# Patient Record
Sex: MALE | Race: WHITE
[De-identification: names, ages, dates, MRNs, and addresses within clinical notes are randomized per-mention and may not be internally consistent; named-entity substitution may affect disease eponyms.]

---

## 2020-01-10 ENCOUNTER — HOSPITAL ENCOUNTER (EMERGENCY)
Dept: HOSPITAL 41 - JD.ED | Age: 40
LOS: 1 days | Discharge: HOME | End: 2020-01-11
Payer: COMMERCIAL

## 2020-01-10 VITALS — HEART RATE: 64 BPM | DIASTOLIC BLOOD PRESSURE: 89 MMHG | SYSTOLIC BLOOD PRESSURE: 141 MMHG

## 2020-01-10 DIAGNOSIS — S20.219A: Primary | ICD-10-CM

## 2020-01-10 DIAGNOSIS — Z88.8: ICD-10-CM
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
## 2020-01-10 PROCEDURE — 99285 EMERGENCY DEPT VISIT HI MDM: CPT

## 2020-01-10 PROCEDURE — 71260 CT THORAX DX C+: CPT

## 2020-01-11 NOTE — EDM.PDOC
ED HPI GENERAL MEDICAL PROBLEM





- General


Chief Complaint: Chest Pain


Stated Complaint: chest injury


Time Seen by Provider: 01/10/20 23:51


Source of Information: Reports: Patient


History Limitations: Reports: No Limitations





- History of Present Illness


INITIAL COMMENTS - FREE TEXT/NARRATIVE: 





This is a 39-year-old male.  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 

gallon drum that was filled with 45 gallons of waste material he was up on a 

forklift type palate and as he grabbed a barrel to tip it over the [place] 

slipped out from underneath it and the barrel came down and struck the edge of 

it struck him in his upper sternum.  This barrel had been up maybe about 5 feet 

or so when he tried to tip it over and it struck him.  He was knocked back and 

down but he did not hit his head and he denies any back injury or extremity 

injuries.  He does have central anterior chest pain with deep breathing and 

with movement of his muscles.  He is in no distress at this time and he is 

breathing normally.  He has not had any nausea or vomiting.


  ** Chest


Pain Score (Numeric/FACES): 8





- Related Data


 Allergies











Allergy/AdvReac Type Severity Reaction Status Date / Time


 


ANITIOTIC Allergy  Nausea Uncoded 01/14/15 13:07











Home Meds: 


 Home Meds





. [No Known Home Meds]  01/14/15 [History]











Past Medical History





- Past Health History


Medical/Surgical History: Denies Medical/Surgical History





ED ROS GENERAL





- Review of Systems


Review Of Systems: See Below


Constitutional: Denies: Fever, Chills


HEENT: Reports: No Symptoms


Respiratory: Reports: Other (As per HPI)


Cardiovascular: Reports: No Symptoms


Endocrine: Reports: No Symptoms


GI/Abdominal: Denies: Abdominal Pain


: Reports: No Symptoms


Musculoskeletal: Reports: Other (Anterior chest pain)


Skin: Reports: No Symptoms


Neurological: Reports: No Symptoms


Psychiatric: Reports: No Symptoms


Hematologic/Lymphatic: Reports: No Symptoms





ED EXAM, GENERAL





- Physical Exam


Exam: See Below


Exam Limited By: No Limitations


General Appearance: Alert, WD/WN, Mild Distress


Eye Exam: Bilateral Eye: Normal Inspection


Ears: Normal External Exam


Nose: Normal Inspection


Throat/Mouth: Normal Inspection, Normal Lips, Normal Voice, No Airway Compromise


Head: Atraumatic, Normocephalic


Neck: Supple, Non-Tender


Respiratory/Chest: No Respiratory Distress, Lungs Clear, Other (Good bilateral 

breath sounds, but deep breathing causes sternal pain and across the anterior 

chest pain but he is in no distress with breathing, movement of his arms or 

twisting causes increased pain in the anterior chest as well)


Cardiovascular: Regular Rate, Rhythm, No Murmur


GI/Abdominal: Soft


Back Exam: Full Range of Motion


Extremities: Normal Inspection, Normal Range of Motion


Neurological: Alert, Oriented


Psychiatric: Normal Affect, Normal Mood


Skin Exam: Warm, Dry





Course





- Vital Signs


Last Recorded V/S: 


 Last Vital Signs











Temp  97.6 F   01/10/20 23:53


 


Pulse  64   01/10/20 23:53


 


Resp  18   01/10/20 23:53


 


BP  141/89 H  01/10/20 23:53


 


Pulse Ox  100   01/10/20 23:53














- Orders/Labs/Meds


Orders: 


 Active Orders 24 hr











 Category Date Time Status


 


 Chest w Cont [CT] Stat Exams  01/10/20 23:57 Taken


 


 Sodium Chloride 0.9% [Saline Flush] Med  01/11/20 00:02 Active





 10 ml FLUSH ONETIME PRN   








 Medication Orders





Sodium Chloride (Saline Flush)  10 ml FLUSH ONETIME PRN


   PRN Reason: KEEP VEIN OPEN


   Last Admin: 01/11/20 00:37  Dose: 10 ml








Meds: 


Medications











Generic Name Dose Route Start Last Admin





  Trade Name Freq  PRN Reason Stop Dose Admin


 


Sodium Chloride  10 ml  01/11/20 00:02  01/11/20 00:37





  Saline Flush  FLUSH   10 ml





  ONETIME PRN   Administration





  KEEP VEIN OPEN   





     





     





     














Discontinued Medications














Generic Name Dose Route Start Last Admin





  Trade Name Freq  PRN Reason Stop Dose Admin


 


Iopamidol  80 ml  01/11/20 00:02  01/11/20 00:37





  Isovue-300 (61%)  IVPUSH  01/11/20 00:03  80 ml





  ONETIME ONE   Administration





     





     





     





     














- Radiology Interpretation


Free Text/Narrative:: 





The scan of the chest did not show any acute abnormalities and no fractures it 

was noted to have some noncalcified pulmonary nodules but the patient appears 

to be at low risk no follow-up was indicated.





- Re-Assessments/Exams


Free Text/Narrative Re-Assessment/Exam: 





01/11/20 01:24


I spoke to the patient regarding the CT scan results.  I also spoke to him 

about the noncalcified pulmonary nodules but since he is at low risk with no 

history of smoking or other known pulmonary factors he does not need to have 

follow-up at this time.  We talked about using ice to the chest taking 

ibuprofen or Aleve and being very careful about using his arms since he has a 

contusion to his chest.





Departure





- Departure


Time of Disposition: 01:24


Disposition: Home, Self-Care 01


Condition: Fair


Clinical Impression: 


 Contusion of muscle





Contusion of chest wall


Qualifiers:


 Encounter type: initial encounter Laterality: unspecified laterality Qualified 

Code(s): S20.219A - Contusion of unspecified front wall of thorax, initial 

encounter





Contusion of sternum


Qualifiers:


 Encounter type: initial encounter Qualified Code(s): S20.219A - Contusion of 

unspecified front wall of thorax, initial encounter








- Discharge Information


*PRESCRIPTION DRUG MONITORING PROGRAM REVIEWED*: Not Applicable


*COPY OF PRESCRIPTION DRUG MONITORING REPORT IN PATIENT [place]: Not Applicable


Instructions:  Chest Contusion, Adult, Easy-to-Read


Referrals: 


PCP,Not In Area [Primary Care Provider] - 


Forms:  ED Department Discharge, ED Return to Work/School Form


Additional Instructions: 


Very careful activity over the next week to 7 days, try to avoid any sort of 

straining or lifting with your arms and cross body movements with your arms, 

realize the tomorrow you are going to be more sore than you are now, use ice to 

your chest on and off for the next 2 days and then you may start using some heat

, take some ibuprofen or Aleve as needed for the soreness, no strenuous lifting 

for the next 7 to 10 days, follow-up with your family doctor later this week 

for recheck or return to the ER if your symptoms worsen





Sepsis Event Note





- Evaluation


Sepsis Screening Result: No Definite Risk





- Focused Exam


Vital Signs: 


 Vital Signs











  Temp Pulse Resp BP Pulse Ox


 


 01/10/20 23:53  97.6 F  64  18  141/89 H  100











Date Exam was Performed: 01/11/20


Time Exam was Performed: 01:23





- My Orders


Last 24 Hours: 


My Active Orders





01/10/20 23:57


Chest w Cont [CT] Stat 





01/11/20 00:02


Sodium Chloride 0.9% [Saline Flush]   10 ml FLUSH ONETIME PRN 














- Assessment/Plan


Last 24 Hours: 


My Active Orders





01/10/20 23:57


Chest w Cont [CT] Stat 





01/11/20 00:02


Sodium Chloride 0.9% [Saline Flush]   10 ml FLUSH ONETIME PRN

## 2020-01-12 NOTE — CT
CT chest

 

Technique: Multiple axial sections were obtained from above the lung 

apices inferiorly through the lung bases.

 

Comparison: Previous chest x-ray of 01/14/15.  Previous chest CT study

 of 09/20/13 is also available.

 

Findings: Mediastinum and hilar regions show no abnormality.  Aorta 

shows no aneurysm.  No mediastinal hematoma is seen.  No axillary 

adenopathy is seen.  Visualized upper abdominal structures show no 

discrete abnormality.

 

Lung window settings were reviewed.  No acute parenchymal process is 

seen within either side of the chest.  Minimal atelectasis or scarring

 is noted within the left base.  Several very small nodules measuring 

less than 5 mm are noted.  No pleural effusions are seen.

 

Bone window settings were reviewed.  No discrete rib fracture is 

appreciated.  Mild compression deformity is seen within T1.  No 

definite acute fracture line is appreciated.  Other vertebral body 

heights are maintained within the thoracic spine.  Lateral 

reconstructed sagittal views of the sternum appear intact.

 

Impression:

1.  Mild compression deformity of T1.  No acute fracture lines are 

seen and this may be old.

2.  Several small nodules measuring less than 5 mm.  If patient is not

 a smoker, these can be ignored.  If patient is a smoker, recommend 

noncontrast chest CT in one year.

3.  Nothing acute is appreciated on CT study of the chest.

 

Diagnostic code #3

 

This report was dictated in [place] Standard Time

 

I agree with preliminary report from [facility], finalized on 01/11/20, 2:11

 AM Central Time

## 2021-10-02 NOTE — EDM.PDOC
ED HPI GENERAL MEDICAL PROBLEM





- General


Chief Complaint: Upper Extremity Injury/Pain


Stated Complaint: LT ARM INJURY


Time Seen by Provider: 10/02/21 22:48


Source of Information: Reports: Patient, RN Notes Reviewed


History Limitations: Reports: No Limitations





- History of Present Illness


INITIAL COMMENTS - FREE TEXT/NARRATIVE: 





Patient is a 40 year old male who presents to the ED for his left wrist injury. 

The patient was at a bar drinking, trying to help a friend, when he stumbled 

over his boots and fell backwards. He has had pain in his left wrist since then,

there is a mild deformity noted at triage. Patient is denying any 

numbness/tingling into the hand. He is predominantly right handed. He did not 

take anything for pain prior to coming to the ER. He is denying any sick 

symptoms like fever or chills, cough or SOB, nausea/vomiting or diarrhea.


  ** left arm


Pain Score (Numeric/FACES): 8





- Related Data


                                    Allergies











Allergy/AdvReac Type Severity Reaction Status Date / Time


 


ANITIOTIC Allergy Unknown Nausea Uncoded 10/02/21 21:26











Home Meds: 


                                    Home Meds





. [No Known Home Meds]  01/14/15 [History]











Past Medical History





- Past Health History


Medical/Surgical History: Denies Medical/Surgical History


HEENT History: Reports: None


Cardiovascular History: Reports: None


Respiratory History: Reports: Other (See Below)


Other Respiratory History: walking Pneumonia


Gastrointestinal History: Reports: None


Genitourinary History: Reports: None


Musculoskeletal History: Reports: Other (See Below)


Other Musculoskeletal History: Left shoulder injury, Dislocated rib, broke bilat

 feet, surgery on bilat feet,


Neurological History: Reports: None


Psychiatric History: Reports: None


Endocrine/Metabolic History: Reports: None


Hematologic History: Reports: None


Immunologic History: Reports: None


Oncologic (Cancer) History: Reports: None


Dermatologic History: Reports: None





Social & Family History





- Caffeine Use


Caffeine Use: Reports: None





Review of Systems





- Review of Systems


Review Of Systems: Comprehensive ROS is negative, except as noted in HPI.





ED EXAM, GENERAL





- Physical Exam


Exam: See Below


Exam Limited By: No Limitations


General Appearance: Alert, WD/WN, No Apparent Distress


Respiratory/Chest: No Respiratory Distress, Lungs Clear, Normal Breath Sounds, 

No Accessory Muscle Use, Chest Non-Tender


Cardiovascular: Normal Peripheral Pulses, Regular Rate, Rhythm, No Edema


Peripheral Pulses: 2+: Radial (L), Radial (R)


Extremities: Normal Capillary Refill, Joint Swelling (mild amount of swelling to

 left wrist), Limited Range of Motion (of left wrist d/t pain)


Neurological: Alert, Oriented, Normal Cognition, No Motor/Sensory Deficits


Psychiatric: Normal Affect, Normal Mood


Skin Exam: Warm, Dry, Intact, Normal Color, No Rash





ED TRAUMA EXTREMITY PROCEDURES





- Splinting


  ** Left Upper Extremity


Splint Site: left wrist


Pre-Procedure NV Status: Normal


Post-Procedure NV Status: Normal


Splint Material: Fiberglass


Splint Design: Gutter (ulnar gutter short arm)


Applied & Form Fitted By: Provider, Nurse


Provider Post-Splint Application NV Check: NV Status Normal, Good Position





Course





- Orders/Labs/Meds


Orders: 





                               Active Orders 24 hr











 Category Date Time Status


 


 Wrist Comp Min 3V Lt [CR] Stat Exams  10/02/21 21:24 Taken











Meds: 





Medications














Discontinued Medications














Generic Name Dose Route Start Last Admin





  Trade Name Freq  PRN Reason Stop Dose Admin


 


Hydromorphone HCl  1 mg  10/02/21 22:49 





  Hydromorphone 1 Mg/Ml Syringe  IM  10/02/21 22:50 





  ONETIME ONE  














- Re-Assessments/Exams


Free Text/Narrative Re-Assessment/Exam: 





10/02/21 23:16


Patient presents to the ED for his left wrist injury. He will be given 1 mg IM 

Dilaudid for pain management.





Departure





- Departure


Time of Disposition: 23:17


Disposition: Home, Self-Care 01


Condition: Good


Clinical Impression: 


Distal radius fracture, left


Qualifiers:


 Encounter type: initial encounter Fracture type: closed Fracture morphology: 

other fracture Qualified Code(s): S52.592A - Other fractures of lower end of 

left radius, initial encounter for closed fracture








- Discharge Information


*PRESCRIPTION DRUG MONITORING PROGRAM REVIEWED*: Yes


*COPY OF PRESCRIPTION DRUG MONITORING REPORT IN PATIENT [place]: No


Instructions:  Wrist Fracture Treated With Immobilization, Easy-to-Read


Referrals: 


PCP,Not In Area [Primary Care Provider] - 


Additional Instructions: 


You have been evaluated in the ED for your left wrist injury.





Your x-ray demonstrated a fracture of your left distal radius, this is a 

comminuted, and may need surgical management to repair.





Please use ice as tolerated to the affected area. You may elevate the affected 

area to provide further relief from swelling.





You may take Tylenol 500 mg or ibuprofen 600mg q6 hrs for pain relief. Please do

so until you have a tolerable level of pain with activity. Do not exceed 4000mg 

Tylenol, Do not exceed 3200mg ibuprofen in a 24 hour time period.





You were given a prescription for a strong pain medication, 

oxycodone/acetaminophen 5/325, please take 1 tab every 6 hours as needed for 

pain not relieved by Tylenol or ibuprofen alone.  Please note this does contain 

Tylenol in it, so do not take more than 4000 mg in a 24-hour time span.  These 

medications can be addictive, so please take as few as possible to achieve 

adequate pain control.  These meds can also be quite constipating, recommend 

that you increase your oral fluid intake and take a stool softener like MiraLAX 

while taking these medications.





Please call Ortho for follow-up and further evaluation Dr. Gamez is our 

orthopedic surgeon, his office number is [phone].  Please call and set up 

an appointment as soon as possible for further management.





Please return to ED if your symptoms should change or worsen.








- My Orders


Last 24 Hours: 





My Active Orders





10/02/21 21:24


Wrist Comp Min 3V Lt [CR] Stat 














- Assessment/Plan


Last 24 Hours: 





My Active Orders





10/02/21 21:24


Wrist Comp Min 3V Lt [CR] Stat

## 2021-10-03 NOTE — CR
Left wrist: 4 views of the left wrist were obtained.

 

Comparison: No prior studies available.

 

Comminuted distal radial fracture is noted which shows articular 

extension.  There is posterior impaction being seen with dorsal tilt 

of the distal radial articular margin.

 

Small fracture is noted within the ulnar styloid process.

 

Soft tissue swelling is noted.  No additional bony abnormality is 

seen.

 

Impression:

1.  Distal radial fracture with articular extension and mild posterior

 impaction.

2.  Ulnar styloid avulsion fracture.

3.  Soft tissue swelling.

 

Diagnostic code #3

## 2021-10-07 NOTE — [id number]
Post Anesthesia Note





- EVALUATION WITHIN 48HRS OF ANESTHETIC


Vital Signs in Normal Range: Yes


Patient Participated in Evaluation: Yes


Respiratory Function Stable: Yes


Airway Patent: Yes


Cardiovascular Function Stable: Yes


Hydration Status Stable: Yes


Pain Control Satisfactory: Yes (took pain pills)


Nausea and Vomiting Control Satisfactory: Yes


Mental Status Recovered: Yes


Vital Signs: 


                                Last Vital Signs











Temp  97.2 F   10/07/21 12:50


 


Pulse  89   10/07/21 12:08


 


Resp  18   10/07/21 12:50


 


BP  139/106 H  10/07/21 12:50


 


Pulse Ox  95   10/07/21 12:50

## 2021-10-07 NOTE — PCM.PREANE
Preanesthetic Assessment





- Procedure


Proposed Procedure: 





Closed reduction with casting vs open reduction with internal fixation of left 

distal radius fracture 





- Anesthesia/Transfusion/Family Hx


Anesthesia History: Prior Anesthesia Reaction


Type of Anesthesia Reaction: Anesthesia Awareness (sounds like in a foot surgery

which may have been a MAC anesthetic )


Family History of Anesthesia Reaction: No


Transfusion History: No Prior Transfusion(s)


Intubation History: Unknown





- Review of Systems


General: No Symptoms


Pulmonary: No Symptoms


Cardiovascular: No Symptoms


Gastrointestinal: No Symptoms


Neurological: No Symptoms


Other: Reports: None





- Physical Assessment


NPO Status Date: 10/06/21


NPO Status Time: 20:30


Vital Signs: 





97.4


72


159/92


95%


18


Height: 1.88 m


Weight: 102 kg


ASA Class: 2


Mental Status: Alert & Oriented x3


Airway Class: Mallampati = 2


Dentition: Reports: Normal Dentition


Thyro-Mental Finger Breadths: 3


Mouth Opening Finger Breadths: 5


ROM/Head Extension: Full


Lungs: Clear to Auscultation, Normal Respiratory Effort


Cardiovascular: Regular Rate, Regular Rhythm





- Allergies


Allergies/Adverse Reactions: 


                                    Allergies











Allergy/AdvReac Type Severity Reaction Status Date / Time


 


levofloxacin [From Levaquin] Allergy  Nausea and Verified 10/06/21 14:45





   Vomiting  














- Blood


Blood Available: No





- Anesthesia Plan


Pre-Op Medication Ordered: None





- Acknowledgements


Anesthesia Type Planned: General Anesthesia


Pt an Appropriate Candidate for the Planned Anesthesia: Yes


Alternatives and Risks of Anesthesia Discussed w Pt/Guardian: Yes


Pt/Guardian Understands and Agrees with Anesthesia Plan: Yes





PreAnesthesia Questionnaire





- Past Health History


Medical/Surgical History: Denies Medical/Surgical History


HEENT History: Reports: None


Cardiovascular History: Reports: None


Respiratory History: Reports: Other (See Below)


Other Respiratory History: walking Pneumonia


Gastrointestinal History: Reports: None


Genitourinary History: Reports: None


OB/GYN History: Reports: None


Musculoskeletal History: Reports: Other (See Below)


Other Musculoskeletal History: Left shoulder injury, Dislocated rib, broke bilat

feet, surgery on bilat feet,


Neurological History: Reports: None


Psychiatric History: Reports: None


Endocrine/Metabolic History: Reports: None


Hematologic History: Reports: None


Immunologic History: Reports: None


Oncologic (Cancer) History: Reports: None


Dermatologic History: Reports: None





- Infectious Disease History


Infectious Disease History: Reports: None





- Past Surgical History


Head Surgeries/Procedures: Reports: None


HEENT Surgical History: Reports: Oral Surgery


Cardiovascular Surgical History: Reports: None


Respiratory Surgical History: Reports: None


GI Surgical History: Reports: None


Female  Surgical History: Reports: None


Male  Surgical History: Reports: None


Endocrine Surgical History: Reports: None


Neurological Surgical History: Reports: None


Musculoskeletal Surgical History: Reports: Other (See Below)


Other Musculoskeletal Surgeries/Procedures:: bilateral bunionectomies


Oncologic Surgical History: Reports: None


Dermatological Surgical History: Reports: None





- SUBSTANCE USE


Tobacco Use Status *Q: Former Tobacco User


Recreational Drug Use History: No





- HOME MEDS


Home Medications: 


                                    Home Meds





Ascorbic Acid [Vitamin C] 1,000 mg PO DAILY 10/06/21 [History]


Echinacea 1,000 mg PO DAILY 10/06/21 [History]


Ibuprofen 200 mg PO BID PRN 10/06/21 [History]


Zinc 50 mg PO DAILY 10/06/21 [History]


oxyCODONE HCl/Acetaminophen [Oxycodone-Acetaminophen 5-325] 1 - 2 tab PO Q6H PRN

#40 10/06/21 [Rx]











- CURRENT (IN HOUSE) MEDS


Current Meds: 





                               Current Medications





Lactated Ringer's (Ringers, Lactated)  1,000 mls @ 125 mls/hr IV ASDIRECTED TERESA


   Stop: 10/07/21 23:00


Lidocaine/Sodium Bicarbonate (Lidocaine 1%/Sod Bicarbonate In Ns 8.4% 1 Ml 

Syringe)  0.25 ml IDERM ONETIME PRN


   PRN Reason: Prior to IV Start


   Stop: 10/07/21 18:00


Sodium Chloride (Sodium Chloride 0.9% 10 Ml Syringe)  10 ml FLUSH ASDIRECTED PRN


   PRN Reason: Keep Vein Open


   Stop: 10/07/21 18:00





Discontinued Medications





Dexamethasone (Dexamethasone 4 Mg/Ml 5 Ml Mdv) Confirm Administered Dose 20 mg 

.ROUTE .STK-MED ONE


   Stop: 10/07/21 09:47


Fentanyl (Fentanyl 250 Mcg/5 Ml Sdv) Confirm Administered Dose 250 mcg .ROUTE 

.STK-MED ONE


   Stop: 10/07/21 09:44


Lidocaine HCl (Xylocaine-Mpf 1%) Confirm Administered Dose 4 mls @ as directed 

.ROUTE .STK-MED ONE


   Stop: 10/07/21 09:44


Midazolam HCl (Midazolam 1 Mg/Ml 2 Ml Sdv) Confirm Administered Dose 2 mg .ROUTE

 .STK-MED ONE


   Stop: 10/07/21 09:43


Ondansetron HCl (Ondansetron 4 Mg/2 Ml Sdv) Confirm Administered Dose 4 mg 

.ROUTE .STK-MED ONE


   Stop: 10/07/21 09:47


Propofol (Propofol 200 Mg/20 Ml Sdv) Confirm Administered Dose 200 mg .ROUTE 

.[facility]-MED ONE


   Stop: 10/07/21 09:43

## 2021-10-07 NOTE — PCM.POSTAN
POST ANESTHESIA ASSESSMENT





- MENTAL STATUS


Mental Status: Alert, Oriented





- VITAL SIGNS


Vital Signs: 


                                Last Vital Signs











Temp  97.4 F   10/07/21 09:15


 


Pulse  72   10/07/21 09:15


 


Resp  18   10/07/21 09:15


 


BP  159/92 H  10/07/21 09:15


 


Pulse Ox  95   10/07/21 09:15








1208


143/103


97%


89


12


97.2





- RESPIRATORY


Respiratory Status: Respiratory Rate WNL, Airway Patent, O2 Saturation Stable, 

Supplemental Oxygen





- CARDIOVASCULAR


CV Status: Pulse Rate WNL, Blood Pressure Stable





- GASTROINTESTINAL


GI Status: No Symptoms





- PAIN


Pain Score: 0





- POST OP HYDRATION


Hydration Status: Adequate & Stable

## 2021-10-07 NOTE — CR
Left wrist: 11 fluoroscopic spot views were obtained of the left 

wrist.

 

Comparison: Prior left wrist exam of 10/02/21.

 

Study shows reduction and fixation with fiberglass cast of previous 

distal radial fracture.  Alignment on final films is very close to 

anatomic.  Fracture is also noted within the ulnar styloid process 

which likewise is close to anatomic in alignment on final films.

 

Fluoroscopy time is given as 14.2 seconds.

 

Impression:

1.  Reduction and fixation by fiberglass cast of previous distal 

radial fracture and ulnar styloid process fracture.

 

Diagnostic code #2

## 2021-10-12 NOTE — PCM.OPNOTE
- General Post-Op/Procedure Note


Date of Surgery/Procedure: 10/07/21


Operative Procedure(s): closed reduction and casting of left distal radius 

fracture


Pre Op Diagnosis: displaced left distal radius fracture


Post-Op Diagnosis: Same


Anesthesia Technique: General LMA


Primary Surgeon: Tarun Gamez


Anesthesia Provider: Janelle Henry


Assistant: Tiff Hogan in mLs: 0


Complications: None


Condition: Good

## 2021-10-18 NOTE — OR
DATE OF OPERATION:  10/07/2021

 

SURGEON:  Tarun Gamez MD

 

OPERATION PERFORMED:  Closed reduction and casting of left distal radius

fracture.

 

PREOPERATIVE DIAGNOSIS:

Comminuted, displaced left distal radius fracture.

 

POSTOPERATIVE DIAGNOSIS:

Comminuted, displaced left distal radius fracture.

 

ANESTHESIA:

General LMA.

 

ANESTHESIA PROVIDER:

Janelle Henry CRNA

 

ASSISTANT:

Tiff Hogan PA-C.

 

ESTIMATED BLOOD LOSS:

Not applicable.

 

COMPLICATIONS:

None.

 

CONDITION:

Stable.

 

DESCRIPTION OF PROCEDURE:

The patient was identified in the preoperative holding area.  Proper site was

marked and identified by the surgeon.  The patient was taken back to the

operative theater, where after adequate anesthesia, a time-out was performed.

At this time, a closed reduction maneuver was done to the left distal radius.

At this time, the patient had near anatomic alignment with volar tilt, the

radial height and radial inclination near anatomic.  At this time, stockinette

was applied, under cast padding was applied and a short-arm cast was applied and

molded in ulnar deviation and traction.  C-arm fluoroscopy was utilized showing

nearly anatomically reduced on both AP lateral view and 23 degree lateral views.

The patient was sent to the PACU in stable condition.  Cast instructions were

given.  We will follow up in clinic in a week's time for x-ray through cast.

 

DD:  10/18/2021 11:36:15

DT:  10/18/2021 11:46:35  LEONIDAS

Job #:  [id number]